# Patient Record
Sex: FEMALE | Race: WHITE
[De-identification: names, ages, dates, MRNs, and addresses within clinical notes are randomized per-mention and may not be internally consistent; named-entity substitution may affect disease eponyms.]

---

## 2018-07-20 ENCOUNTER — HOSPITAL ENCOUNTER (EMERGENCY)
Dept: HOSPITAL 56 - MW.ED | Age: 43
Discharge: HOME | End: 2018-07-20
Payer: SELF-PAY

## 2018-07-20 DIAGNOSIS — N13.2: Primary | ICD-10-CM

## 2018-07-20 LAB
CHLORIDE SERPL-SCNC: 100 MMOL/L (ref 98–107)
SODIUM SERPL-SCNC: 136 MMOL/L (ref 136–145)

## 2018-07-20 PROCEDURE — 81001 URINALYSIS AUTO W/SCOPE: CPT

## 2018-07-20 PROCEDURE — 80053 COMPREHEN METABOLIC PANEL: CPT

## 2018-07-20 PROCEDURE — 99284 EMERGENCY DEPT VISIT MOD MDM: CPT

## 2018-07-20 PROCEDURE — 74177 CT ABD & PELVIS W/CONTRAST: CPT

## 2018-07-20 PROCEDURE — 36415 COLL VENOUS BLD VENIPUNCTURE: CPT

## 2018-07-20 PROCEDURE — 85025 COMPLETE CBC W/AUTO DIFF WBC: CPT

## 2018-07-20 PROCEDURE — 96375 TX/PRO/DX INJ NEW DRUG ADDON: CPT

## 2018-07-20 PROCEDURE — 96361 HYDRATE IV INFUSION ADD-ON: CPT

## 2018-07-20 PROCEDURE — 96374 THER/PROPH/DIAG INJ IV PUSH: CPT

## 2018-07-20 NOTE — EDM.PDOC
<Thanh Hernandez - Last Filed: 07/20/18 20:34>





ED HPI GENERAL MEDICAL PROBLEM





- General


Chief Complaint: Back Pain or Injury


Stated Complaint: POSSIBLE KIDNEY STONE


Time Seen by Provider: 07/20/18 17:33


Source of Information: Reports: Patient


History Limitations: Reports: No Limitations





- History of Present Illness


INITIAL COMMENTS - FREE TEXT/NARRATIVE: 





This is Dr. Hernandez taking over patient care at 1900 from Dr. Henao. I have 

discussed the case and reviewed all pertinent history, physical, laboratory and 

radiographic findings of this patient. I have also personally examined the 

patient and agree with all the above.





Patient is a 42-year-old female with past medical history of kidney stones 

complaining of left lower quadrant pain. Initial CBC showed some mild 

leukocytosis 14K, CMP showed mild elevation of bilirubin at 1.5. UA/UC pending 

as well as CT abdomen and pelvis.





Diagnostic:


CBC, CMP, UA/UC, CT abdomen and pelvis








Therapeutic:


Toradol 30 mg IV 1, Reglan 10 mg IV 1, morphine 4 mg IV 2, Zofran 4 mg IV 1

, Dilaudid 1 mg IV 1, 1 L normal saline 1








Impression:


Left lower quadrant abdominal pain


Left ureterolithiasis


Mild left hydronephrosis





Plan:


CT showed mild left hydronephrosis and hydroureter secondary to a 5 mm stone in 

the distal left ureter. There was also incidental findings of hepatic 

steatonecrosis and hepatomegaly.


Urinalysis was negative for UTI. Leukocyte most likely stress induced from pain 

as no history of fever, chills and remained afebrile while in ER.


Did talk with Dr. Fuentes, urologist, who recommended follow-up with him on 

Monday.


Patient was discharged with Norco  mg and tamsulosin with instructions to 

call and follow-up with Dr. Mike on Monday.


She was instructed to return to emergency department if she had any new or 

worsening symptoms.





Definitive disposition and diagnosis as appropriate pending reevaluation and 

review above.








- Related Data


 Allergies











Allergy/AdvReac Type Severity Reaction Status Date / Time


 


No Known Allergies Allergy   Verified 07/20/18 17:42











Home Meds: 


 Home Meds





. [No Known Home Meds]  07/20/18 [History]











ED ROS GENERAL





- Review of Systems


Review Of Systems: ROS reveals no pertinent complaints other than HPI.





ED EXAM, GI/ABD





- Physical Exam


Exam: See Below





Course





- Vital Signs


Last Recorded V/S: 


 Last Vital Signs











Temp  98.3 F   07/20/18 17:42


 


Pulse  99   07/20/18 20:38


 


Resp  18   07/20/18 20:38


 


BP  132/74   07/20/18 20:38


 


Pulse Ox  99   07/20/18 20:38














- Orders/Labs/Meds


Orders: 


 Active Orders 24 hr











 Category Date Time Status


 


 Abdomen Pelvis w Cont [CT] Stat Exams  07/20/18 19:11 Taken


 


 UA W/MICROSCOPIC [URIN] Stat Lab  07/20/18 20:15 Ordered


 


 Saline Lock Insert [OM.PC] Stat Oth  07/20/18 17:34 Ordered











Labs: 


 Laboratory Tests











  07/20/18 07/20/18 07/20/18 Range/Units





  17:56 17:56 20:15 


 


WBC  13.94 H    (4.0-11.0)  K/uL


 


RBC  3.79 L    (4.30-5.90)  M/uL


 


Hgb  14.2    (12.0-16.0)  g/dL


 


Hct  40.5    (36.0-46.0)  %


 


MCV  106.9 H    (80.0-98.0)  fL


 


MCH  37.5 H    (27.0-32.0)  pg


 


MCHC  35.1    (31.0-37.0)  g/dL


 


RDW Std Deviation  45.7    (28.0-62.0)  fl


 


RDW Coeff of Kari  12    (11.0-15.0)  %


 


Plt Count  112 L    (150-400)  K/uL


 


MPV  10.70    (7.40-12.00)  fL


 


Neut % (Auto)  96.6 H    (48.0-80.0)  %


 


Lymph % (Auto)  2.8 L    (16.0-40.0)  %


 


Mono % (Auto)  0.4    (0.0-15.0)  %


 


Eos % (Auto)  0.1    (0.0-7.0)  %


 


Baso % (Auto)  0.1    (0.0-1.5)  %


 


Neut # (Auto)  13.5 H    (1.4-5.7)  K/uL


 


Lymph # (Auto)  0.4 L    (0.6-2.4)  K/uL


 


Mono # (Auto)  0.1    (0.0-0.8)  K/uL


 


Eos # (Auto)  0.0    (0.0-0.7)  K/uL


 


Baso # (Auto)  0.0    (0.0-0.1)  K/uL


 


Nucleated RBC %  0.0    /100WBC


 


Nucleated RBCs #  0    K/uL


 


Sodium   136   (136-145)  mmol/L


 


Potassium   3.7   (3.5-5.1)  mmol/L


 


Chloride   100   ()  mmol/L


 


Carbon Dioxide   25.0   (21.0-32.0)  mmol/L


 


BUN   12   (7.0-18.0)  mg/dL


 


Creatinine   0.9   (0.6-1.0)  mg/dL


 


Est Cr Clr Drug Dosing   60.82   mL/min


 


Estimated GFR (MDRD)   > 60.0   ml/min


 


Glucose   121 H   ()  mg/dL


 


Calcium   9.4   (8.5-10.1)  mg/dL


 


Total Bilirubin   1.5 H   (0.2-1.0)  mg/dL


 


AST   59 H   (15-37)  IU/L


 


ALT   55   (14-63)  IU/L


 


Alkaline Phosphatase   81   ()  U/L


 


Total Protein   7.7   (6.4-8.2)  g/dL


 


Albumin   4.1   (3.4-5.0)  g/dL


 


Globulin   3.6 H   (2.0-3.5)  g/dL


 


Albumin/Globulin Ratio   1.1 L   (1.3-2.8)  


 


Urine Color    YELLOW  


 


Urine Appearance    HAZY  


 


Urine pH    6.5  (5.0-8.0)  


 


Ur Specific Gravity    1.010  (1.001-1.035)  


 


Urine Protein    NEGATIVE  (NEGATIVE)  mg/dL


 


Urine Glucose (UA)    NEGATIVE  (NEGATIVE)  mg/dL


 


Urine Ketones    40 H  (NEGATIVE)  mg/dL


 


Urine Occult Blood    TRACE-INTACT  (NEGATIVE)  


 


Urine Nitrite    NEGATIVE  (NEGATIVE)  


 


Urine Bilirubin    NEGATIVE  (NEGATIVE)  


 


Urine Urobilinogen    0.2  (<2.0)  EU/dL


 


Ur Leukocyte Esterase    NEGATIVE  (NEGATIVE)  


 


Urine RBC    2-4  (0-2/HPF)  


 


Urine WBC    3-5  (0-5/HPF)  


 


Ur Epithelial Cells    FEW  (NONE-FEW)  


 


Urine Bacteria    FEW  (NEGATIVE)  











Meds: 


Medications














Discontinued Medications














Generic Name Dose Route Start Last Admin





  Trade Name Freq  PRN Reason Stop Dose Admin


 


Hydromorphone HCl  1 mg  07/20/18 19:47  07/20/18 19:53





  Dilaudid  IVPUSH  07/20/18 19:48  1 mg





  ONETIME ONE   Administration





     





     





     





     


 


Sodium Chloride  1,000 mls @ 999 mls/hr  07/20/18 17:34  07/20/18 17:59





  Normal Saline  IV  07/20/18 18:34  999 mls/hr





  .Bolus ONE   Administration





     





     





     





     


 


Iopamidol  80 ml  07/20/18 19:37  07/20/18 20:02





  Isovue Multipack-370 (76%)  IVPUSH  07/20/18 19:38  80 ml





  ONETIME ONE   Administration





     





     





     





     


 


Ketorolac Tromethamine  30 mg  07/20/18 18:28  07/20/18 18:33





  Toradol  IVPUSH  07/20/18 18:29  30 mg





  ONETIME ONE   Administration





     





     





     





     


 


Metoclopramide HCl  10 mg  07/20/18 18:28  07/20/18 18:33





  Reglan  IV  07/20/18 18:29  10 mg





  ONETIME ONE   Administration





     





     





     





     


 


Morphine Sulfate  4 mg  07/20/18 17:41  07/20/18 17:53





  Morphine  IVPUSH  07/20/18 17:42  Not Given





  ONETIME ONE   





     





     





     





     


 


Morphine Sulfate  4 mg  07/20/18 17:49  07/20/18 17:59





  Morphine  IVPUSH  07/20/18 17:50  4 mg





  ONETIME ONE   Administration





     





     





     





     


 


Morphine Sulfate  4 mg  07/20/18 19:45  





  Morphine  IVPUSH  07/20/18 19:46  





  ONETIME ONE   





     





     





     





     


 


Ondansetron HCl  4 mg  07/20/18 17:41  07/20/18 17:59





  Zofran  IVPUSH  07/20/18 17:42  4 mg





  ONETIME ONE   Administration





     





     





     





     


 


Sodium Chloride  10 ml  07/20/18 17:34  





  Saline Flush  FLUSH   





  ASDIRECTED PRN   





  Keep Vein Open   





     





     





     


 


Sodium Chloride  2.5 ml  07/20/18 17:34  





  Saline Flush  FLUSH   





  ASDIRECTED PRN   





  Keep Vein Open   





     





     





     














Departure





- Departure


Time of Disposition: 20:37


Disposition: Home, Self-Care 01


Condition: Good


Clinical Impression: 


 Ureterolithiasis, Hydronephrosis, left








- Discharge Information


Instructions:  Renal Colic, Easy-to-Read, Kidney Stones, Easy-to-Read, 

Hydronephrosis


Referrals: 


PCP,None [Primary Care Provider] - 


Forms:  ED Department Discharge


Additional Instructions: 


My general discharge


The following information is given to patients seen in the emergency department 

who are being discharged to home. This information is to outline your options 

for follow-up care. We provide all patients seen in our emergency department 

with a follow-up referral.





The need for follow-up, as well as the timing and circumstances, are variable 

depending upon the specifics of your emergency department visit.





If you don't have a primary care physician on staff, we will provide you with a 

referral. We always advise you to contact your personal physician following an 

emergency department visit to inform them of the circumstance of the visit and 

for follow-up with them and/or the need for any referrals to a consulting 

specialist.





The emergency department will also refer you to a specialist when appropriate. 

This referral assures that you have the opportunity for follow-up care with a 

specialist. All of these measure are taken in an effort to provide you with 

optimal care, which includes your follow-up.





Under all circumstances we always encourage you to contact your private 

physician who remains a resource for coordinating your care. When calling for 

follow-up care, please make the office aware that this follow-up is from your 

recent emergency room visit. If for any reason you are refused follow-up, 

please contact the [facility] Emergency 

Department at [phone] and asked to speak to the emergency department 

charge nurse.





[facility]


Specialty Care - Urology


[address] [place] [address]


Phone: [phone]


Fax: [phone]








Please call number above for Dr. Mike. Be sure to tell them you were seen in 

the emergency department and they requested that you follow-up with Dr. Mike 

as soon as possible.


Take medications as prescribed.


Return to emergency department if any new or worsening symptoms as we discussed.





- My Orders


Last 24 Hours: 


My Active Orders





07/20/18 17:34


Saline Lock Insert [OM.PC] Stat 





07/20/18 20:15


UA W/MICROSCOPIC [URIN] Stat 














- Assessment/Plan


Last 24 Hours: 


My Active Orders





07/20/18 17:34


Saline Lock Insert [OM.PC] Stat 





07/20/18 20:15


UA W/MICROSCOPIC [URIN] Stat 














<Germania Henao - Last Filed: 07/21/18 09:23>





ED HPI GENERAL MEDICAL PROBLEM





- General


Source of Information: Reports: Patient


History Limitations: Reports: No Limitations





- History of Present Illness


INITIAL COMMENTS - FREE TEXT/NARRATIVE: 


History of present illness:


[]Patient began having left sided flank and abdominal pain at 8:30 this morning 

that has progressively worsened throughout the day. Patient has also had nausea

, vomiting and chills. She denies any fevers, diarrhea, chest pain or shortness 

of breath. She has had kidney stones in the past and states that this does not 

feel similar.





Review of systems: 


As per history of present illness and below otherwise all systems reviewed and 

negative.





Past medical history: 


As per history of present illness and as reviewed below otherwise 

noncontributory.





Surgical history: 


As per history of present illness and as reviewed below otherwise 

noncontributory.





Social history: 


No reported history of drug or alcohol abuse.





Family history: 


As per history of present illness and as reviewed below otherwise 

noncontributory.





Physical exam:


General: Well developed, well nourished in NAD


HEENT: Atraumatic, normocephalic, pupils reactive, negative for conjunctival 

pallor or scleral icterus, mucous membranes moist, throat clear, neck supple, 

nontender, trachea midline.


Lungs: Clear to auscultation, breath sounds equal bilaterally, chest nontender.


Heart: S1S2, regular, negative for clicks, rubs, or JVD.


Abdomen: Soft, nondistended, nontender. Negative for masses or 

hepatosplenomegaly. Negative for costovertebral tenderness.


Pelvis: Stable nontender.


Genitourinary: Deferred.


Rectal: Deferred.


Extremities: Atraumatic, negative for cords or calf pain. Neurovascular 

unremarkable.


Neuro: Awake, alert, oriented. Cranial nerves II through XII unremarkable. 

Cerebellum unremarkable. Motor and sensory unremarkable throughout. Exam 

nonfocal.





Diagnostics:


[]Labs ordered results pending Dr. Hernandez to review





Therapeutics:


[]IV fluids morphine and Zofran given





Impression: 


[]Diagnosis per Dr. Hernandez





Plan:


[]Disposition per Dr. Hernandez





Definitive disposition and diagnosis as appropriate pending reevaluation and 

review of above.





  ** Left Abdomen


Pain Score (Numeric/FACES): 10





ED ROS GENERAL





- Review of Systems


Review Of Systems: ROS reveals no pertinent complaints other than HPI.





ED EXAM, GI/ABD





- Physical Exam


Exam: See Below (See history of present illness)





Course





- Vital Signs


Last Recorded V/S: 


 Last Vital Signs











Temp  98.3 F   07/20/18 17:42


 


Pulse  99   07/20/18 20:38


 


Resp  18   07/20/18 20:38


 


BP  132/74   07/20/18 20:38


 


Pulse Ox  99   07/20/18 20:38














- Orders/Labs/Meds


Labs: 


 Laboratory Tests











  07/20/18 07/20/18 07/20/18 Range/Units





  17:56 17:56 20:15 


 


WBC  13.94 H    (4.0-11.0)  K/uL


 


RBC  3.79 L    (4.30-5.90)  M/uL


 


Hgb  14.2    (12.0-16.0)  g/dL


 


Hct  40.5    (36.0-46.0)  %


 


MCV  106.9 H    (80.0-98.0)  fL


 


MCH  37.5 H    (27.0-32.0)  pg


 


MCHC  35.1    (31.0-37.0)  g/dL


 


RDW Std Deviation  45.7    (28.0-62.0)  fl


 


RDW Coeff of Kari  12    (11.0-15.0)  %


 


Plt Count  112 L    (150-400)  K/uL


 


MPV  10.70    (7.40-12.00)  fL


 


Neut % (Auto)  96.6 H    (48.0-80.0)  %


 


Lymph % (Auto)  2.8 L    (16.0-40.0)  %


 


Mono % (Auto)  0.4    (0.0-15.0)  %


 


Eos % (Auto)  0.1    (0.0-7.0)  %


 


Baso % (Auto)  0.1    (0.0-1.5)  %


 


Neut # (Auto)  13.5 H    (1.4-5.7)  K/uL


 


Lymph # (Auto)  0.4 L    (0.6-2.4)  K/uL


 


Mono # (Auto)  0.1    (0.0-0.8)  K/uL


 


Eos # (Auto)  0.0    (0.0-0.7)  K/uL


 


Baso # (Auto)  0.0    (0.0-0.1)  K/uL


 


Nucleated RBC %  0.0    /100WBC


 


Nucleated RBCs #  0    K/uL


 


Sodium   136   (136-145)  mmol/L


 


Potassium   3.7   (3.5-5.1)  mmol/L


 


Chloride   100   ()  mmol/L


 


Carbon Dioxide   25.0   (21.0-32.0)  mmol/L


 


BUN   12   (7.0-18.0)  mg/dL


 


Creatinine   0.9   (0.6-1.0)  mg/dL


 


Est Cr Clr Drug Dosing   60.82   mL/min


 


Estimated GFR (MDRD)   > 60.0   ml/min


 


Glucose   121 H   ()  mg/dL


 


Calcium   9.4   (8.5-10.1)  mg/dL


 


Total Bilirubin   1.5 H   (0.2-1.0)  mg/dL


 


AST   59 H   (15-37)  IU/L


 


ALT   55   (14-63)  IU/L


 


Alkaline Phosphatase   81   ()  U/L


 


Total Protein   7.7   (6.4-8.2)  g/dL


 


Albumin   4.1   (3.4-5.0)  g/dL


 


Globulin   3.6 H   (2.0-3.5)  g/dL


 


Albumin/Globulin Ratio   1.1 L   (1.3-2.8)  


 


Urine Color    YELLOW  


 


Urine Appearance    HAZY  


 


Urine pH    6.5  (5.0-8.0)  


 


Ur Specific Gravity    1.010  (1.001-1.035)  


 


Urine Protein    NEGATIVE  (NEGATIVE)  mg/dL


 


Urine Glucose (UA)    NEGATIVE  (NEGATIVE)  mg/dL


 


Urine Ketones    40 H  (NEGATIVE)  mg/dL


 


Urine Occult Blood    TRACE-INTACT  (NEGATIVE)  


 


Urine Nitrite    NEGATIVE  (NEGATIVE)  


 


Urine Bilirubin    NEGATIVE  (NEGATIVE)  


 


Urine Urobilinogen    0.2  (<2.0)  EU/dL


 


Ur Leukocyte Esterase    NEGATIVE  (NEGATIVE)  


 


Urine RBC    2-4  (0-2/HPF)  


 


Urine WBC    3-5  (0-5/HPF)  


 


Ur Epithelial Cells    FEW  (NONE-FEW)  


 


Urine Bacteria    FEW  (NEGATIVE)

## 2018-07-23 NOTE — CT
EXAM DATE: 18



PATIENT'S AGE: 42





Patient: JARETH REEDER



Facility: [place], [place]

Patient ID: [id number]

Site Patient ID: [id number].

Site Accession #: [id number]

: 1975

Study: CT Abdomen/Pelvis w cont [id number]/20/2018 7:40:42 PM

Ordering Physician: David Law



Final Report: 

INDICATION:

Left lower pain since this morning, history of renal stones 



TECHNIQUE:

CT abdomen and pelvis acquired with 80 cc Isovue 370 IV contrast.



COMPARISON:

None 



FINDINGS:

Lower chest: Unremarkable. 



Liver: Hepatic steatosis. The liver measures 23 cm in length. 



Spleen: Unremarkable. 



Pancreas: Unremarkable. 



Gallbladder and bile ducts: Unremarkable. 



Adrenal glands: Unremarkable. 



Kidneys: Mild left hydronephrosis and hydroureter secondary to a 5 mm stone in 
the distal left ureter. There is mild left perinephric fat stranding and left 
renal edema. There is a simple cyst on the left kidney. No additional renal 
stone identified. 



GI tract: Unremarkable. Appendix is normal. 



Vascular structures: Unremarkable. 



Lymph nodes: Unremarkable. 



Miscellaneous: Unremarkable. No free air or significant free fluid. 



Pelvic Organs: Status post hysterectomy. 



Bones: Unremarkable for age. 



IMPRESSION:

Mild left hydronephrosis and hydroureter secondary to a 5 mm stone in the 
distal left ureter. No additional renal stone identified. 



Hepatic steatosis and hepatomegaly.



Status post hysterectomy.



Please note that all CT scans at this facility use dose modulation, iterative 
reconstruction, and/or weight-based dosing when appropriate to reduce radiation 
dose to as low as reasonably achievable.



Dictated by Stephanie Cardenas MD @ 2018 8:01PM

(Electronic Signature)







Report Signed by Proxy.
KRISTEN